# Patient Record
Sex: FEMALE | Race: WHITE
[De-identification: names, ages, dates, MRNs, and addresses within clinical notes are randomized per-mention and may not be internally consistent; named-entity substitution may affect disease eponyms.]

---

## 2018-05-01 ENCOUNTER — HOSPITAL ENCOUNTER (EMERGENCY)
Dept: HOSPITAL 88 - FSED | Age: 43
Discharge: HOME | End: 2018-05-01
Payer: COMMERCIAL

## 2018-05-01 VITALS — BODY MASS INDEX: 42.68 KG/M2 | HEIGHT: 64 IN | WEIGHT: 250 LBS

## 2018-05-01 DIAGNOSIS — J32.0: ICD-10-CM

## 2018-05-01 DIAGNOSIS — G43.909: Primary | ICD-10-CM

## 2018-05-01 DIAGNOSIS — J32.2: ICD-10-CM

## 2018-05-01 PROCEDURE — 99284 EMERGENCY DEPT VISIT MOD MDM: CPT

## 2018-05-28 ENCOUNTER — HOSPITAL ENCOUNTER (EMERGENCY)
Dept: HOSPITAL 88 - FSED | Age: 43
Discharge: HOME | End: 2018-05-28
Payer: COMMERCIAL

## 2018-05-28 VITALS — WEIGHT: 250 LBS | HEIGHT: 64 IN | BODY MASS INDEX: 42.68 KG/M2

## 2018-05-28 DIAGNOSIS — S86.819A: Primary | ICD-10-CM

## 2018-05-28 DIAGNOSIS — S80.212A: ICD-10-CM

## 2018-05-28 DIAGNOSIS — S80.02XA: ICD-10-CM

## 2018-05-28 DIAGNOSIS — J06.9: ICD-10-CM

## 2018-05-28 DIAGNOSIS — I10: ICD-10-CM

## 2018-05-28 PROCEDURE — 99284 EMERGENCY DEPT VISIT MOD MDM: CPT

## 2018-05-28 NOTE — XMS REPORT
Continuity of Care Document

 Created on: 2018



ANITA SEO

External Reference #: [id number]

: 1975

Sex: Female



Demographics







 Address  [id number]

[place]  [address]

 

 Home Phone  [phone]

 

 Preferred Language  Unknown

 

 Marital Status  Unknown

 

 Anabaptist Affiliation  Unknown

 

 Race  White

 

 Ethnic Group  Unknown





Author







 Author  [facility]

 

 Organization  [facility]

 

 Address  [address]

[place]  [address]



 

 Phone  Unavailable







Support







 Name  Relationship  Address  Phone

 

 MARIETTA MARTINEZ MD  Caregiver  Unknown  Unavailable

 

 GIOVANY EMILY ROSS  Caregiver  [id number]

[address]

[place]  [address]  [phone]

 

 ORLANDOEMILY MAYA DO  Caregiver  [id number]

[address]

[place]  [address]  [phone]

 

 ETHAN ADKINS  Next Of Kin  [address]

[place], [place]  [address] [phone]







Care Team Providers







 Care Team Member Name  Role  Phone

 

 GIOVANY EMILY ROSS  PCP  [phone]







Insurance Providers







 Guarantor  VinnyNoahAnita

 

 Address  [id number]

[place] [address]

 

 Phone  [phone]

 

 Email  [email or web address]











 Payer  [employer] o

 

 Policy Number  [id number]

 

 Subscriber's Name  Anita Seo

 

 Relationship  18 Self / Same As Patient







Advance Directives







 Directive  Response  Recorded Date/Time

 

 Does the patient have an advance directive?  No  18 11:19am

 

 Do you have a Directive to Physician?  No  18 11:19am

 

 Do you have a Medical Power of ?  No  18 11:19am

 

 Do you have an out of hospital Do Not Resuscitate Order?  No  18 11:19am

 

 Do you have any special needs we should be aware of?  No  18 11:19am

 

 Do you have a support person here with you today?  Yes  18 11:19am

 

 Did patient receive Notice of Privacy Practices?  Yes  18 11:19am

 

 Did patient receive patient rights and responsibilities?  Yes  18 11:19am







Problems

No problem information available.



Medications

No medication information available.



Social History

No social history information available.



Hospital Discharge Instructions

No hospital discharge instruction information available.



Plan of Care







 Discharge Date  18 11:03am

 

 Disposition  HOME, SELF-CARE

 

 Condition at Discharge  Stable

 

 Instructions/Education Provided  Headache

Headache - Migraine (Adult)

Sinusitis - Chronic

 

 Forms Provided  Work/School Excuse

 

 Prescriptions  See Medication Section

 

 Referrals  EMILY ADAIR DO

Address:

 [address]

[address]

[place], [place] [address] [phone]





FRANK SHANNON M.D.

Order Date: Call for an appointment

Address:

 [address] [place] [address] [address]

[place] [address]

 [phone]

 

 Additional Instructions/Education  FOLLOW UP WITH NEUROLOGY FOR HEADACHE







Functional Status

No functional status information available.



Allergies, Adverse Reactions, Alerts







 Allergen  Type  Severity  Reaction  Status  Last Updated

 

 Prochlorperazine  Allergy  Unknown     Active  18

 

 Sulfamethoxazole  Allergy  Unknown     Active  18

 

 Trimethoprim  Allergy  Unknown     Active  18

 

 Promethazine  Allergy  Unknown     Active  18

 

 Levofloxacin  Allergy  Unknown     Active  18

 

 LUIS E  Allergy  Unknown     Active  18

 

 PCN  Allergy  Unknown     Active  18







Immunizations

No immunization information available.



Vital Signs





Acute Vital Signs





 Vital  Response  Date/Time

 

 Height  5 ft 4 in  2018 10:56am

 

 Weight  250 lb  2018 10:56am

 

 Body Mass Index  42.9 kg/m^2  2018 10:56am







Results

No relevant diagnostic test, laboratory data and/or discharge summary 
information available.



Procedures

No procedure information available.



Encounters







 Encounter  Location  Arrival/Admit Date  Discharge/Depart Date  Attending 
Provider

 

 Departed Emergency Room  [facility]  18 10:01am   11:03am  MARIETTA MARTINEZ MD